# Patient Record
Sex: FEMALE | Race: OTHER | NOT HISPANIC OR LATINO | URBAN - METROPOLITAN AREA
[De-identification: names, ages, dates, MRNs, and addresses within clinical notes are randomized per-mention and may not be internally consistent; named-entity substitution may affect disease eponyms.]

---

## 2022-05-07 ENCOUNTER — EMERGENCY (EMERGENCY)
Facility: HOSPITAL | Age: 82
LOS: 1 days | Discharge: HOME | End: 2022-05-07
Attending: EMERGENCY MEDICINE | Admitting: STUDENT IN AN ORGANIZED HEALTH CARE EDUCATION/TRAINING PROGRAM
Payer: MEDICARE

## 2022-05-07 VITALS
RESPIRATION RATE: 18 BRPM | DIASTOLIC BLOOD PRESSURE: 58 MMHG | TEMPERATURE: 98 F | HEART RATE: 70 BPM | OXYGEN SATURATION: 100 % | SYSTOLIC BLOOD PRESSURE: 149 MMHG

## 2022-05-07 PROCEDURE — 73130 X-RAY EXAM OF HAND: CPT | Mod: 26,RT

## 2022-05-07 PROCEDURE — 29125 APPL SHORT ARM SPLINT STATIC: CPT

## 2022-05-07 PROCEDURE — 99285 EMERGENCY DEPT VISIT HI MDM: CPT | Mod: 25

## 2022-05-07 PROCEDURE — 73120 X-RAY EXAM OF HAND: CPT | Mod: 26,52,RT

## 2022-05-07 PROCEDURE — 70450 CT HEAD/BRAIN W/O DYE: CPT | Mod: 26,MA

## 2022-05-07 PROCEDURE — 73090 X-RAY EXAM OF FOREARM: CPT | Mod: 26,RT

## 2022-05-07 PROCEDURE — 73110 X-RAY EXAM OF WRIST: CPT | Mod: 26,RT

## 2022-05-07 PROCEDURE — 73100 X-RAY EXAM OF WRIST: CPT | Mod: 26,59

## 2022-05-07 NOTE — ED PROVIDER NOTE - NS_EDPROVIDERDISPOUSERTYPE_ED_A_ED
I will SWITCH the dose or number of times a day I take the medications listed below when I get home from the hospital:  None Attending Attestation (For Attendings USE Only)...

## 2022-05-07 NOTE — ED PROCEDURE NOTE - CPROC ED POST PROC CARE GUIDE1
follow up with DR Charles Kaufman /Verbal/written post procedure instructions were given to patient/caregiver./Instructed patient/caregiver regarding signs and symptoms of infection.

## 2022-05-07 NOTE — ED PROVIDER NOTE - ATTENDING APP SHARED VISIT CONTRIBUTION OF CARE
I performed a face to face evaluation of this patient and obtained a history and performed a full exam.  I agree with the history, physical exam and plan of the PA.  Pt s/p fall a few days ago, trip and fall with pain and swelling to wrist with ecchymoses; from of the elbow, distal neurovascular intact and neuro exam wnl. Heart and lungs wnl.  For xrays, head ct and reassess, sign out given to Dr. Dubose.

## 2022-05-07 NOTE — ED PROVIDER NOTE - PROGRESS NOTE DETAILS
Lucy Dubose MD, Attending: Patient received at attending sign out from Dr. Ratliff, distal radial fx, non displacement, ct head negative finding, post-mechanical fall, pt is AOx4, reliable family members, agreed with out pt follow up, splint and out pt ortho follow up.

## 2022-05-07 NOTE — ED PROVIDER NOTE - CHPI ED SYMPTOMS POS
TRANSITIONAL CARE MANAGEMENT - HOSPITAL DISCHARGE FOLLOW-UP    Attempted to contact patient for TCM follow-up after hospital discharge on 2/11/21, but review of notes indicate patient is currently hospitalized as SLSS.       EDEMA/JOINT SWELLING/PAIN/TENDERNESS

## 2022-05-07 NOTE — ED PROVIDER NOTE - NSFOLLOWUPINSTRUCTIONS_ED_ALL_ED_FT
PLEASE MAKE SURE YOU FOLLOW UP WITH DR VALENTE DIOP ORTHOPEDIST () AS SOON AS POSSIBLE.     Fracture    A fracture is a break in one of your bones. This can occur from a variety of injuries, especially traumatic ones. Symptoms include pain, bruising, or swelling. Do not use the injured limb. If a fracture is in one of the bones below your waist, do not put weight on that limb unless instructed to do so by your healthcare provider. Crutches or a cane may have been provided. A splint or cast may have been applied by your health care provider. Make sure to keep it dry and follow up with an orthopedist as instructed.    SEEK IMMEDIATE MEDICAL CARE IF YOU HAVE ANY OF THE FOLLOWING SYMPTOMS: numbness, tingling, increasing pain, or weakness in any part of the injured limb.

## 2022-05-07 NOTE — ED PROCEDURE NOTE - PROCEDURE DATE TIME, MLM
Patient presented to clinic for  and demonstration of the 2 Week actigraphy. Patient was set up and instructed use. Patient verbalized understanding and will be returning device by 01/11/2018 @ 8pm sleep study    Patient was given sleep logs and written instructions for use    LAURIE Nolasco  Sleep Clinic-Specialist,    Registered Medical Assistant   [facility]      
07-May-2022 16:27

## 2022-05-07 NOTE — ED PROVIDER NOTE - NS ED ATTENDING STATEMENT MOD
This was a shared visit with the FEDERICO. I reviewed and verified the documentation and independently performed the documented:

## 2022-05-07 NOTE — ED ADULT TRIAGE NOTE - CHIEF COMPLAINT QUOTE
Pt. states she slipped and fell in the driveway, landing on right arm. Went to urgent care and was told right wrist is broken and to come to ED for further evaluation. Arrives with right arm in sling. Has CD of imaging. Denies numbness. PMHx: HTN

## 2022-05-07 NOTE — ED PROVIDER NOTE - PATIENT PORTAL LINK FT
You can access the FollowMyHealth Patient Portal offered by [facility] by registering at the following website: [email or web address]. By joining [facility]’s FollowMyHealth portal, you will also be able to view your health information using other applications (apps) compatible with our system.

## 2022-05-07 NOTE — ED PROVIDER NOTE - PHYSICAL EXAMINATION
right forearm, bruising, discoloration, tenderness, + ulnar and radial pulses right forearm, bruising, discoloration, tenderness, + ulnar and radial pulses  + swelling to right parietal bone  + bruising ecchymosis to right forearm, wrist and thumb

## 2022-05-07 NOTE — ED PROVIDER NOTE - CLINICAL SUMMARY MEDICAL DECISION MAKING FREE TEXT BOX
This is a 82 yr old F, pmh htn, hysterectomy s/p fall, injury to right forearm on Wednesday, fall and slip on the , hit the concrete.  Bruising, swollen, tender, some limited rom to wrist. Reports pain 3/10, took tylenol pta.  xray wrist and forearm, thumb   ortho consult possible This is a 82 yr old F, pmh htn, hysterectomy s/p fall, injury to right forearm on Wednesday, fall and slip on the , hit the concrete.  Bruising, swollen, tender, some limited rom to wrist. Reports pain 3/10, took tylenol pta.  xray wrist and forearm, thumb   ct head r/o but not limited to subdural   ortho consult possible This is a 82 yr old F, pmh htn, hysterectomy s/p fall, injury to right forearm on Wednesday, fall and slip on the , hit the concrete.  Bruising, swollen, tender, some limited rom to wrist. Reports pain 3/10, took tylenol pta.  xray wrist and forearm, thumb - nondisplaced right radius fx   ct head r/o but not limited to subdural - wnl  ortho consult possible

## 2022-05-09 PROBLEM — I10 ESSENTIAL (PRIMARY) HYPERTENSION: Chronic | Status: ACTIVE | Noted: 2022-05-07

## 2022-05-10 ENCOUNTER — TRANSCRIPTION ENCOUNTER (OUTPATIENT)
Age: 82
End: 2022-05-10

## 2022-05-10 ENCOUNTER — APPOINTMENT (OUTPATIENT)
Dept: ORTHOPEDIC SURGERY | Facility: CLINIC | Age: 82
End: 2022-05-10
Payer: MEDICARE

## 2022-05-10 VITALS — HEIGHT: 64 IN | WEIGHT: 130 LBS | BODY MASS INDEX: 22.2 KG/M2

## 2022-05-10 PROCEDURE — 29075 APPL CST ELBW FNGR SHORT ARM: CPT

## 2022-05-10 NOTE — HISTORY OF PRESENT ILLNESS
[de-identified] : Patient is a RHD 82 year old female who presents with complaints of right wrist pain secondary to a fracture which occurred on 5/4/22. At that time, she fell while in her home. She landed on an outstretched but did not immediately feel pain. Her daughter who is present today states her mother did no immediately tell her of the injury as again she was not in much pain. She was seen in the ER at [facility] on Saturday 5/7/22 where xrays were obtained and revealed a nondisplaced distal radius fracture. She was splnited and referred to me for further treatment.

## 2022-05-10 NOTE — END OF VISIT
[FreeTextEntry3] : All medical record entries made by the Scribe were at my,  Dr. Anupam William MD., direction and personally dictated by me on 05/10/2022. I have personally reviewed the chart and agree that the record accurately reflects my personal performance of the history, physical exam, assessment and plan.

## 2022-05-10 NOTE — ADDENDUM
[FreeTextEntry1] : I, Rand Wade wrote this note acting as a scribe for Dr. Anupam William on May 10, 2022.

## 2022-05-10 NOTE — RETURN TO WORK/SCHOOL
[FreeTextEntry1] : Ms. TA MCKEON was seen in the office today on 05/10/2022 and evaluated by me for an Orthopedic visit. Please be advised that it is my recommendation she be provided with an at home health aide at this time. She is unable to complete daily activities of living given that she is right handed and sustained a fracture to the right wrist on 5/4/22 after a slip and fall in her home. \par \par Sincerely,\par \par Dr. Anupam William M.D. on 05/10/2022.

## 2022-05-10 NOTE — DISCUSSION/SUMMARY
[de-identified] : The underlying pathophysiology was reviewed with the patient. XR films were reviewed with the patient. Discussed at length the nature of the patient’s condition. The right wrist symptoms appear secondary to distal radius fracture.\par \par At this time, given the good alignment of the fracture, I have recommended cast immobilization for 6 weeks.\par She was placed into a right short arm cast on 5/10/22.\par Proper cast care instructions were reviewed with the patient.\par She as advised that she may use the hand for ADLs as tolerated while casted.\par She was instructed on ROM exercises of the shoulder, elbow, wrist and hand while casted.\par Motrin prn.\par \par All questions answered, understanding verbalized. Patient in agreement with plan of care. Follow up in 3 weeks for repeat xrays in the cast.

## 2022-05-10 NOTE — PHYSICAL EXAM
[de-identified] : Patient is WDWN, alert, and in no acute distress. Breathing is unlabored. She is grossly oriented to person, place, and time.\par \par She is accompanied by her daughter today.\par \par Right Wrist:\par She presents in a volar fiber glass wrist splint which was removed for physical exam and cast placement.\par She has mild edema noted to the dorsum of the hand and dorsally to the wrist.\par ROM to the wrist not accessed given injury.\par Full digital motion present into flexion and extension.\par Sensation to the digits is intact and normal. [de-identified] : EXAM: XR FOREARM 2 VIEWS RT\par EXAM: XR WRIST COMP MIN 3 VIEWS RT\par PROCEDURE DATE: 05/07/2022\par IMPRESSION:\par Acute impacted nondisplaced fracture of the distal radial head with associated soft tissue swelling.\par \par HERRERA DUENAS MD; Resident Radiologist\par This document has been electronically signed.\par RAUDEL ZULUAGA MD; Attending Radiologist\par This document has been electronically signed. May 7 2022 2:55PM

## 2022-05-11 ENCOUNTER — TRANSCRIPTION ENCOUNTER (OUTPATIENT)
Age: 82
End: 2022-05-11

## 2022-05-31 ENCOUNTER — APPOINTMENT (OUTPATIENT)
Dept: ORTHOPEDIC SURGERY | Facility: CLINIC | Age: 82
End: 2022-05-31
Payer: MEDICARE

## 2022-05-31 VITALS — HEIGHT: 63 IN | WEIGHT: 125 LBS | BODY MASS INDEX: 22.15 KG/M2

## 2022-05-31 PROCEDURE — 73110 X-RAY EXAM OF WRIST: CPT | Mod: RT

## 2022-05-31 PROCEDURE — 99213 OFFICE O/P EST LOW 20 MIN: CPT

## 2022-05-31 NOTE — END OF VISIT
Report given to EZRA Alexander.   [FreeTextEntry3] : All medical record entries made by the Scribe were at my,  Dr. Anupam William MD., direction and personally dictated by me on 05/31/2022. I have personally reviewed the chart and agree that the record accurately reflects my personal performance of the history, physical exam, assessment and plan.

## 2022-05-31 NOTE — DISCUSSION/SUMMARY
[de-identified] : The underlying pathophysiology was reviewed with the patient. XR films were reviewed with the patient. Discussed at length the nature of the patient’s condition. The right wrist symptoms appear secondary to distal radius fracture.\par \par She was advised that the fracture is healing well and has not displaced since the initial xrays and casting in office 3 weeks ago. She will continue in the cast for another 3 weeks. \par \par All questions answered, understanding verbalized. Patient in agreement with plan of care. Follow up in 3 weeks for repeat xrays and cast removal.

## 2022-05-31 NOTE — PHYSICAL EXAM
[de-identified] : Patient is WDWN, alert, and in no acute distress. Breathing is unlabored. She is grossly oriented to person, place, and time.\par \par She is accompanied by her daughter today.\par \par Right Wrist:\par She presents in a right short arm cast, which is fitting well.\par Digits are moving freely with brisk capillary refill\par Sensation is intact to the digits.  [de-identified] : AP, lateral and oblique views of the RIGHT wrist were obtained today and revealed an impacted nondisplaced fracture to the distal radius. The fracture is perfectly aligned. \par \par ------------------------------------------------------------------------------------------------------------------------------------------------------------------------------------\par \par EXAM: XR FOREARM 2 VIEWS RT\par EXAM: XR WRIST COMP MIN 3 VIEWS RT\par PROCEDURE DATE: 05/07/2022\par IMPRESSION:\par Acute impacted nondisplaced fracture of the distal radial head with associated soft tissue swelling.\par \par HERRERA DUENAS MD; Resident Radiologist\par This document has been electronically signed.\par RAUDEL ZULUAGA MD; Attending Radiologist\par This document has been electronically signed. May 7 2022 2:55PM

## 2022-05-31 NOTE — ADDENDUM
[FreeTextEntry1] : I, Rand Wade wrote this note acting as a scribe for Dr. Anupam William on May 31, 2022.

## 2022-05-31 NOTE — HISTORY OF PRESENT ILLNESS
[de-identified] : Patient is a RHD 82 year old female who presents with complaints of right wrist pain secondary to a fracture which occurred on 5/4/22. At that time, she fell while in her home. She landed on an outstretched but did not immediately feel pain. Her daughter who is present today states her mother did no immediately tell her of the injury as again she was not in much pain. She was seen in the ER at [facility] on Saturday 5/7/22 where xrays were obtained and revealed a nondisplaced distal radius fracture. She was initially treated on 5/10/22 in the office at which time I recommended cast immobilization given the good alignment of the fracture. She returns to the office for repeat xrays while in the cast on 5/31/22. She reports to be doing well while in the cast. SHe has minimal pain while casted and has improvements in motion most notably into supination while in the cast. She takes Tylenol intermittently for pain.

## 2022-06-21 ENCOUNTER — APPOINTMENT (OUTPATIENT)
Dept: ORTHOPEDIC SURGERY | Facility: CLINIC | Age: 82
End: 2022-06-21
Payer: MEDICARE

## 2022-06-21 PROCEDURE — 73110 X-RAY EXAM OF WRIST: CPT | Mod: RT

## 2022-06-21 PROCEDURE — 99213 OFFICE O/P EST LOW 20 MIN: CPT

## 2022-06-21 NOTE — PHYSICAL EXAM
[de-identified] : Patient is WDWN, alert, and in no acute distress. Breathing is unlabored. She is grossly oriented to person, place, and time.\par \par She is accompanied by her daughter today.\par \par Right Wrist:\par She presents in a right short arm cast, which was removed in the office today on 6/21/22.\par Once the cast was removed, there is no skin breakdown.\par Limitations of wrist motion status post casting for 6 weeks.\par Mild edema noted to the wrist dorsally. \par Full digital motion into flexion and extension\par Sensation is intact to the digits. \par \par  [de-identified] : AP, lateral and oblique views of the RIGHT wrist were obtained today and revealed an impacted nondisplaced fracture to the distal radius. The fracture is perfectly aligned. \par \par ------------------------------------------------------------------------------------------------------------------------------------------------------------------------------------\par \par EXAM: XR FOREARM 2 VIEWS RT\par EXAM: XR WRIST COMP MIN 3 VIEWS RT\par PROCEDURE DATE: 05/07/2022\par IMPRESSION:\par Acute impacted nondisplaced fracture of the distal radial head with associated soft tissue swelling.\par \par HERRERA DUENAS MD; Resident Radiologist\par This document has been electronically signed.\par RAUDEL ZULUAGA MD; Attending Radiologist\par This document has been electronically signed. May 7 2022 2:55PM

## 2022-06-21 NOTE — END OF VISIT
[FreeTextEntry3] : All medical record entries made by the Scribe were at my,  Dr. Anupam William MD., direction and personally dictated by me on 06/21/2022. I have personally reviewed the chart and agree that the record accurately reflects my personal performance of the history, physical exam, assessment and plan.

## 2022-06-21 NOTE — RETURN TO WORK/SCHOOL
[FreeTextEntry1] : Ms. TA MCKEON was seen in the office today on 06/21/2022 and evaluated by me for an Orthopedic visit secondary to a right wrist fracture sustained 6 weeks ago. Her daughter, Justin Swartz accompanied her to the office visit today. It is my recommendation that she be granted family leave until further notice as her mother fully recovers from the right wrist fracture. Her mother, Ms. TA MCKEON is an 82 year old female who lives on her own and therefore needs the assistance of her daughter to care for her as she recovers.\par \par Sincerely,\par \par Dr. Anupam William M.D. on 06/21/2022.

## 2022-06-21 NOTE — HISTORY OF PRESENT ILLNESS
[de-identified] : Patient is a RHD 82 year old female who presents with complaints of right wrist pain secondary to a fracture which occurred on 5/4/22. At that time, she fell while in her home. She landed on an outstretched but did not immediately feel pain. Her daughter who is present today states her mother did no immediately tell her of the injury as again she was not in much pain. She was seen in the ER at [facility] on Saturday 5/7/22 where xrays were obtained and revealed a nondisplaced distal radius fracture. She was initially treated on 5/10/22 in the office at which time I recommended cast immobilization given the good alignment of the fracture. She returned to the office for repeat xrays while in the cast on 5/31/22. She reported to be doing well while in the cast. She has minimal pain while casted and has improvements in motion most notably into supination while in the cast. She took Tylenol intermittently for pain. She returns on 6/21/22 for repeat xrays and cast removal. She reports to be

## 2022-06-21 NOTE — ADDENDUM
[FreeTextEntry1] : I, Rand Wade wrote this note acting as a scribe for Dr. Anupam William on Jun 21, 2022.

## 2022-06-21 NOTE — DISCUSSION/SUMMARY
[de-identified] : The underlying pathophysiology was reviewed with the patient. XR films were reviewed with the patient. Discussed at length the nature of the patient’s condition. The right wrist symptoms appear secondary to distal radius fracture.\par \par The right short arm cast was removed in the office today on 6/21/22.\par She may use a wrist brace for support as needed, most notably with any sort of heavy lifting or housework. I however advised her to wear the wrist only when necessary as I told that in the long term it will do more harm than good because it will restrict her motion.\par She was advised to use the hand as tolerated for ADLs and was instructed on flexion and extension exercises of the digits and wrist.\par \par All questions answered, understanding verbalized. Patient in agreement with plan of care. Follow up as needed.

## 2022-09-13 ENCOUNTER — APPOINTMENT (OUTPATIENT)
Dept: ORTHOPEDIC SURGERY | Facility: CLINIC | Age: 82
End: 2022-09-13

## 2022-09-13 DIAGNOSIS — S52.591D OTHER FRACTURES OF LOWER END OF RIGHT RADIUS, SUBSEQUENT ENCOUNTER FOR CLOSED FRACTURE WITH ROUTINE HEALING: ICD-10-CM

## 2022-09-13 PROCEDURE — 99213 OFFICE O/P EST LOW 20 MIN: CPT

## 2022-09-13 PROCEDURE — 73110 X-RAY EXAM OF WRIST: CPT | Mod: RT

## 2022-09-13 NOTE — HISTORY OF PRESENT ILLNESS
[de-identified] : Patient is a RHD 82 year old female who presents with complaints of right wrist pain secondary to a fracture which occurred on 5/4/22. At that time, she fell while in her home. She landed on an outstretched but did not immediately feel pain. Her daughter who is present today states her mother did no immediately tell her of the injury as again she was not in much pain. She was seen in the ER at [facility] on Saturday 5/7/22 where xrays were obtained and revealed a nondisplaced distal radius fracture. She was initially treated on 5/10/22 in the office at which time I recommended cast immobilization given the good alignment of the fracture. She returned to the office for repeat xrays while in the cast on 5/31/22. She reported to be doing well while in the cast. She has minimal pain while casted and has improvements in motion most notably into supination while in the cast. She took Tylenol intermittently for pain. She returns on 9/13/22 for repeat xrays. She reports to have residual pain to the wrist. She states the pain increases with increased use and activity.

## 2022-09-13 NOTE — DISCUSSION/SUMMARY
[de-identified] : The underlying pathophysiology was reviewed with the patient. XR films were reviewed with the patient. Discussed at length the nature of the patient’s condition. The right wrist symptoms appear secondary to distal radius fracture.\par \par At this time, I told her that maximum improvement will take up to 1 year and I would expect her to continue to improve. She was instructed to continue to use the hand normally for all ADLs. She may use a wrist brace as needed if her pain increases with any sort of heavy activity.\par \par All questions answered, understanding verbalized. Patient in agreement with plan of care. Follow up as needed.

## 2022-09-13 NOTE — PHYSICAL EXAM
[de-identified] : Patient is WDWN, alert, and in no acute distress. Breathing is unlabored. She is grossly oriented to person, place, and time.\par \par She is accompanied by her daughter today.\par \par Right Wrist:\par Full wrist motion into flexion and extension.\par Mild residual edema noted to the wrist dorsally. \par Full digital motion into flexion and extension\par Sensation is intact to the digits.  [de-identified] : AP, lateral and oblique views of the RIGHT wrist were obtained today and revealed an impacted nondisplaced fracture to the distal radius. The fracture is perfectly aligned. The fracture is fully healed.\par \par ------------------------------------------------------------------------------------------------------------------------------------------------------------------------------------\par \par EXAM: XR FOREARM 2 VIEWS RT\par EXAM: XR WRIST COMP MIN 3 VIEWS RT\par PROCEDURE DATE: 05/07/2022\par IMPRESSION:\par Acute impacted nondisplaced fracture of the distal radial head with associated soft tissue swelling.\par \par HERRERA DUENAS MD; Resident Radiologist\par This document has been electronically signed.\par RAUDEL ZULUAGA MD; Attending Radiologist\par This document has been electronically signed. May 7 2022 2:55PM

## 2022-09-13 NOTE — ADDENDUM
[FreeTextEntry1] : I, Rand Wade wrote this note acting as a scribe for Dr. Anupam William on Sep 13, 2022.

## 2022-09-13 NOTE — END OF VISIT
[FreeTextEntry3] : All medical record entries made by the Scribe were at my,  Dr. Anupam William MD., direction and personally dictated by me on 09/13/2022. I have personally reviewed the chart and agree that the record accurately reflects my personal performance of the history, physical exam, assessment and plan.

## 2023-05-17 ENCOUNTER — APPOINTMENT (OUTPATIENT)
Dept: INTERNAL MEDICINE | Facility: CLINIC | Age: 83
End: 2023-05-17
Payer: MEDICARE

## 2023-05-17 VITALS
WEIGHT: 120 LBS | HEIGHT: 63 IN | DIASTOLIC BLOOD PRESSURE: 67 MMHG | HEART RATE: 65 BPM | OXYGEN SATURATION: 99 % | RESPIRATION RATE: 16 BRPM | TEMPERATURE: 97.6 F | BODY MASS INDEX: 21.26 KG/M2 | SYSTOLIC BLOOD PRESSURE: 133 MMHG

## 2023-05-17 DIAGNOSIS — R60.0 LOCALIZED EDEMA: ICD-10-CM

## 2023-05-17 DIAGNOSIS — Z85.41 PERSONAL HISTORY OF MALIGNANT NEOPLASM OF CERVIX UTERI: ICD-10-CM

## 2023-05-17 DIAGNOSIS — S81.801A UNSPECIFIED OPEN WOUND, RIGHT LOWER LEG, INITIAL ENCOUNTER: ICD-10-CM

## 2023-05-17 PROCEDURE — 36415 COLL VENOUS BLD VENIPUNCTURE: CPT

## 2023-05-17 PROCEDURE — 99204 OFFICE O/P NEW MOD 45 MIN: CPT | Mod: 25

## 2023-05-17 NOTE — PLAN
[FreeTextEntry1] : -Start abx for cellulitis, wound care discussed with patient, check CBC\par -Leg elevation\par -Pending appt w/ vascular\par -Chronic blood pressure medication refilled, BMP today \par Completed labs in office today, will await results and notify patient accordingly\par

## 2023-05-17 NOTE — HEALTH RISK ASSESSMENT
[0] : 2) Feeling down, depressed, or hopeless: Not at all (0) [PHQ-2 Negative - No further assessment needed] : PHQ-2 Negative - No further assessment needed [Never] : Never [[id number]] : 0

## 2023-05-17 NOTE — HISTORY OF PRESENT ILLNESS
[FreeTextEntry1] : New patient, establish care [de-identified] : Ms. TA MCKEON is a 83 year old female hx of HTN, venous stasis, varicosities presenting to establish care\par -For past two weeks with RLE > LLE and erythema and tenderness of lower leg\par -LLE is at her baseline and does note improvement of both legs with elevation, unable to tolerate compression stockings\par Sustained a wound on lateral ankle, no active drainage about two weeks ago as well\par She has an appointment pending with vascular \par -BP at goal, denies dizziness or RAMOS\par \par

## 2023-05-17 NOTE — PHYSICAL EXAM
[Normal] : normal rate, regular rhythm, normal S1 and S2 and no murmur heard [de-identified] : Right lateral ankle with healing ulcer, no active drainage noted, +b/l edema R > L [de-identified] : RLE with warmth, tender, erythema

## 2023-05-18 ENCOUNTER — APPOINTMENT (OUTPATIENT)
Dept: CARDIOLOGY | Facility: CLINIC | Age: 83
End: 2023-05-18
Payer: MEDICARE

## 2023-05-18 PROCEDURE — 93971 EXTREMITY STUDY: CPT

## 2023-05-19 LAB
ANION GAP SERPL CALC-SCNC: 8 MMOL/L
BASOPHILS # BLD AUTO: 0.02 K/UL
BASOPHILS NFR BLD AUTO: 0.3 %
BUN SERPL-MCNC: 21 MG/DL
CALCIUM SERPL-MCNC: 9.7 MG/DL
CHLORIDE SERPL-SCNC: 104 MMOL/L
CO2 SERPL-SCNC: 27 MMOL/L
CREAT SERPL-MCNC: 0.92 MG/DL
EGFR: 62 ML/MIN/1.73M2
EOSINOPHIL # BLD AUTO: 0.04 K/UL
EOSINOPHIL NFR BLD AUTO: 0.7 %
FERRITIN SERPL-MCNC: 89 NG/ML
GLUCOSE SERPL-MCNC: 102 MG/DL
HCT VFR BLD CALC: 32 %
HGB BLD-MCNC: 10 G/DL
IMM GRANULOCYTES NFR BLD AUTO: 0.2 %
IRON SATN MFR SERPL: 14 %
IRON SERPL-MCNC: 44 UG/DL
LYMPHOCYTES # BLD AUTO: 1.67 K/UL
LYMPHOCYTES NFR BLD AUTO: 27.7 %
MAN DIFF?: NORMAL
MCHC RBC-ENTMCNC: 29.4 PG
MCHC RBC-ENTMCNC: 31.3 GM/DL
MCV RBC AUTO: 94.1 FL
MONOCYTES # BLD AUTO: 0.46 K/UL
MONOCYTES NFR BLD AUTO: 7.6 %
NEUTROPHILS # BLD AUTO: 3.82 K/UL
NEUTROPHILS NFR BLD AUTO: 63.5 %
PLATELET # BLD AUTO: 207 K/UL
POTASSIUM SERPL-SCNC: 4.3 MMOL/L
RBC # BLD: 3.4 M/UL
RBC # FLD: 13.6 %
SODIUM SERPL-SCNC: 139 MMOL/L
TIBC SERPL-MCNC: 310 UG/DL
UIBC SERPL-MCNC: 266 UG/DL
VIT B12 SERPL-MCNC: 333 PG/ML
WBC # FLD AUTO: 6.02 K/UL

## 2023-06-05 ENCOUNTER — APPOINTMENT (OUTPATIENT)
Dept: INTERNAL MEDICINE | Facility: CLINIC | Age: 83
End: 2023-06-05
Payer: MEDICARE

## 2023-06-05 ENCOUNTER — APPOINTMENT (OUTPATIENT)
Dept: VASCULAR SURGERY | Facility: CLINIC | Age: 83
End: 2023-06-05
Payer: MEDICARE

## 2023-06-05 VITALS
SYSTOLIC BLOOD PRESSURE: 148 MMHG | HEART RATE: 60 BPM | OXYGEN SATURATION: 97 % | HEIGHT: 63 IN | TEMPERATURE: 98.7 F | DIASTOLIC BLOOD PRESSURE: 65 MMHG | WEIGHT: 124 LBS | BODY MASS INDEX: 21.97 KG/M2

## 2023-06-05 VITALS
SYSTOLIC BLOOD PRESSURE: 150 MMHG | DIASTOLIC BLOOD PRESSURE: 87 MMHG | WEIGHT: 124 LBS | BODY MASS INDEX: 21.97 KG/M2 | OXYGEN SATURATION: 97 % | TEMPERATURE: 98 F | HEART RATE: 66 BPM | HEIGHT: 63 IN

## 2023-06-05 VITALS — SYSTOLIC BLOOD PRESSURE: 130 MMHG | DIASTOLIC BLOOD PRESSURE: 70 MMHG

## 2023-06-05 DIAGNOSIS — L03.115 CELLULITIS OF RIGHT LOWER LIMB: ICD-10-CM

## 2023-06-05 DIAGNOSIS — R60.9 EDEMA, UNSPECIFIED: ICD-10-CM

## 2023-06-05 PROCEDURE — 36415 COLL VENOUS BLD VENIPUNCTURE: CPT

## 2023-06-05 PROCEDURE — 99214 OFFICE O/P EST MOD 30 MIN: CPT | Mod: 25

## 2023-06-05 PROCEDURE — 99203 OFFICE O/P NEW LOW 30 MIN: CPT | Mod: 25

## 2023-06-05 PROCEDURE — 29580 STRAPPING UNNA BOOT: CPT | Mod: LT

## 2023-06-05 NOTE — PHYSICAL EXAM
[Normal] : normal rate, regular rhythm, normal S1 and S2 and no murmur heard [de-identified] : RLE warm to touch, faint erythema

## 2023-06-05 NOTE — HISTORY OF PRESENT ILLNESS
[FreeTextEntry1] : F/U on chronic conditions [de-identified] : -Chronic edema/venous insufficiency-Did not start keflex that was prescribed at initial visit, US negative for thrombosis\par Has appt pending w/ vascular today\par -Chronic anemia: Diagnosed at age 40, was advised to take ferrous sulfate\par Normal colonoscopy in the past\par Brought in previous labs, Hgb ranges from 10-11\par Denies melena, dizziness, or abdominal pain\par -HTN: BP at goal; denies RAMOS or CP\par -Hx of PreDM: Last a1c at 5.7

## 2023-06-05 NOTE — PLAN
[FreeTextEntry1] : -Chronic anemia, unclear etiology, last iron studies normal\par -Check a1c per patient request\par -Did not start abx, chronic skin changes, however, with  areas of erythema, warmth advised to start abx\par -Has new appt w/ vascular today\par Completed labs in office today, will await results and notify patient accordingly\par

## 2023-06-06 NOTE — PHYSICAL EXAM
[2+] : left 2+ [Calm] : calm [de-identified] : Well-appearing  [de-identified] : EOMI, anicteric  [de-identified] : motor and sensation intact in all four extremities  [de-identified] : linear ulcerations on medial right ankle, left foot with 1x0.5 ulceration on medial dorsum of food.  [de-identified] : A&Ox4

## 2023-06-06 NOTE — PROCEDURE
[FreeTextEntry1] : Indication:  Left  lower extremity venous stasis ulcer.\par \par Procedure: left lower extremity unna boot application. \par \par Procedure Note:  Ms. TA MCKEON is a 83 year old F with left lower extremity venous stasis ulcer.\par \par I have discussed the risks of the procedure with the patient. Detailed discussion was held with the patient. Patient tolerated the procedure well. Instructions reviewed with the patient and patient verbalized understanding.\par

## 2023-06-06 NOTE — CONSULT LETTER
[Dear  ___] : Dear  [unfilled], [Consult Letter:] : I had the pleasure of evaluating your patient, [unfilled]. [Please see my note below.] : Please see my note below. [Consult Closing:] : Thank you very much for allowing me to participate in the care of this patient.  If you have any questions, please do not hesitate to contact me. [Sincerely,] : Sincerely, [FreeTextEntry3] : \par \par \par \par Khadijah Rodriguez MD, FACS, RPVI\par Division of Vascular & Endovascular Surgery\par [facility], Department of Surgery  [FreeTextEntry1] : She presented with bilateral venous stasis ulcers. The right leg also has cellulitis. On exam, she has palpable lower extremity pulses. We jasmina obtain venous reflux studies for evaluation. In the meantime, I placed an unna boot on the left leg and plan to treat the right leg with similar dressings once the cellulitis is resolved. I plan to see her again in one week for unna boot change.

## 2023-06-06 NOTE — ASSESSMENT
[FreeTextEntry1] : TA MCKEON is a 83 year old female presents for evaluation.\par \par > Venous insufficiency, CEAP C6\par - Will obtain venous reflux studies for further evaluation. Prescription and number to schedule given to patient. \par - Right foot was wrapped in ace bandage for compression. patient to continue right leg compression with compression stockings, leg elevation, and ambulation.\par - Right leg cellulitis - agree w/ continuation of abx prescribed by Dr. Coker.\par - Left foot with unna boot placement today. \par - Follow up next week for unna boot placement.

## 2023-06-06 NOTE — HISTORY OF PRESENT ILLNESS
[FreeTextEntry1] : TA MCKEON is a 83 year old female who presents for evaluation of left leg ulcer. \par \par Referred by Dr. Kayy Coker. \par \par Patient presents with daughter, Justin, for evaluation of right leg ulcer. The ulcer began one month ago. The patient reports a history of venous insufficiency and had laser ablations on the bilateral legs 3 years ago with Dr. Lezama, who is now retired. No fever or chills. No drainage from the ulcer. Patient wears compression stockings almost daily. She was evaluated by Dr. Sultana and was prescribed antibiotics for her right leg ulcer. \par No prior history of ulcers in the past. No personal or family history of DVT. \par \par + PMH: HTN, anemia (undergoing workup)\par + PSH: hysterectomy\par + FH: denies\par + SH: never smoker, no etoh use, no illicit user\par + Aller: NKDA\par \par PCP is Dr. Sheela Sultana.

## 2023-06-08 ENCOUNTER — NON-APPOINTMENT (OUTPATIENT)
Age: 83
End: 2023-06-08

## 2023-06-08 DIAGNOSIS — D50.9 IRON DEFICIENCY ANEMIA, UNSPECIFIED: ICD-10-CM

## 2023-06-08 LAB
ESTIMATED AVERAGE GLUCOSE: 120 MG/DL
HBA1C MFR BLD HPLC: 5.8 %
HCT VFR BLD CALC: 31.3 %
HGB A MFR BLD: 97.5 %
HGB A2 MFR BLD: 2.5 %
HGB BLD-MCNC: 9.9 G/DL
HGB FRACT BLD-IMP: NORMAL
MCHC RBC-ENTMCNC: 29.6 PG
MCHC RBC-ENTMCNC: 31.6 GM/DL
MCV RBC AUTO: 93.4 FL
PLATELET # BLD AUTO: 190 K/UL
RBC # BLD: 3.35 M/UL
RBC # FLD: 13.4 %
WBC # FLD AUTO: 5.61 K/UL

## 2023-06-12 ENCOUNTER — APPOINTMENT (OUTPATIENT)
Dept: VASCULAR SURGERY | Facility: CLINIC | Age: 83
End: 2023-06-12
Payer: MEDICARE

## 2023-06-12 VITALS
HEART RATE: 60 BPM | BODY MASS INDEX: 24.03 KG/M2 | WEIGHT: 124 LBS | SYSTOLIC BLOOD PRESSURE: 139 MMHG | DIASTOLIC BLOOD PRESSURE: 61 MMHG | HEIGHT: 60.3 IN | TEMPERATURE: 97.5 F | OXYGEN SATURATION: 98 %

## 2023-06-12 PROCEDURE — 29580 STRAPPING UNNA BOOT: CPT | Mod: 50

## 2023-06-12 NOTE — PROCEDURE
[FreeTextEntry1] : Indication:  Bilateral  lower extremity venous stasis ulcer.\par \par Procedure: Bilateral lower extremity unna boot application. \par \par Procedure Note:  Ms. TA MCKEON is a 83 year old F with bilateral lower extremity venous stasis ulcer.\par \par I have discussed the risks of the procedure with the patient. Detailed discussion was held with the patient. Patient tolerated the procedure well. Instructions reviewed with the patient and patient verbalized understanding.\par

## 2023-06-12 NOTE — ASSESSMENT
[FreeTextEntry1] : TA MCKEON is a 83 year old female presents for evaluation.\par \par > Venous insufficiency, CEAP C6\par - Will obtain venous reflux studies for further evaluation. Prescription and number to schedule given to patient. \par - Bilateral unna boot placement.\par - follow up in one week for unna boot exchange.

## 2023-06-12 NOTE — PHYSICAL EXAM
[2+] : left 2+ [Calm] : calm [de-identified] : Well-appearing  [de-identified] : EOMI, anicteric  [de-identified] : motor and sensation intact in all four extremities  [de-identified] : linear ulcerations on medial right ankle, left foot with 1x0.5 ulceration on medial dorsum of food.  [de-identified] : A&Ox4

## 2023-06-12 NOTE — HISTORY OF PRESENT ILLNESS
[FreeTextEntry1] : TA MCKEON is a 83 year old female who presents for evaluation of left leg ulcer. \par \par Referred by Dr. Kayy Coker. \par \par Patient presents with daughter, Justin, for evaluation of right leg ulcer. The ulcer began one month ago. The patient reports a history of venous insufficiency and had laser ablations on the bilateral legs 3 years ago with Dr. Lezama, who is now retired. No fever or chills. No drainage from the ulcer. Patient wears compression stockings almost daily. She was evaluated by Dr. Sultana and was prescribed antibiotics for her right leg ulcer. \par No prior history of ulcers in the past. No personal or family history of DVT. \par \par + PMH: HTN, anemia (undergoing workup)\par + PSH: hysterectomy\par + FH: denies\par + SH: never smoker, no etoh use, no illicit user\par + Aller: NKDA\par \par PCP is Dr. Sheela Sultana.  [de-identified] : 6/12/23 - Pt presents for follow up w/ daughter. right leg cellulitis much improved. no fever or chills. \par left foot wound continues to hurt. has not yet undergone venous reflux studies.

## 2023-06-19 ENCOUNTER — APPOINTMENT (OUTPATIENT)
Dept: VASCULAR SURGERY | Facility: CLINIC | Age: 83
End: 2023-06-19
Payer: MEDICARE

## 2023-06-19 VITALS
BODY MASS INDEX: 24.35 KG/M2 | HEART RATE: 59 BPM | WEIGHT: 124 LBS | DIASTOLIC BLOOD PRESSURE: 69 MMHG | TEMPERATURE: 98 F | OXYGEN SATURATION: 97 % | HEIGHT: 60 IN | SYSTOLIC BLOOD PRESSURE: 144 MMHG

## 2023-06-19 PROCEDURE — 29580 STRAPPING UNNA BOOT: CPT | Mod: 50

## 2023-06-19 NOTE — HISTORY OF PRESENT ILLNESS
[FreeTextEntry1] : TA MCKEON is a 83 year old female who presents for evaluation of left leg ulcer. \par \par Referred by Dr. Kayy Coker. \par \par Patient presents with daughter, Justin, for evaluation of right leg ulcer. The ulcer began one month ago. The patient reports a history of venous insufficiency and had laser ablations on the bilateral legs 3 years ago with Dr. Lezama, who is now retired. No fever or chills. No drainage from the ulcer. Patient wears compression stockings almost daily. She was evaluated by Dr. Sultana and was prescribed antibiotics for her right leg ulcer. \par No prior history of ulcers in the past. No personal or family history of DVT. \par \par + PMH: HTN, anemia (undergoing workup)\par + PSH: hysterectomy\par + FH: denies\par + SH: never smoker, no etoh use, no illicit user\par + Aller: NKDA\par \par PCP is Dr. Sheela Sultana.  [de-identified] : 6/12/23 - Pt presents for follow up w/ daughter. right leg cellulitis much improved. no fever or chills. \par left foot wound continues to hurt. has not yet undergone venous reflux studies. \par \par 6/19/2023 - Pt presetns for follow up. Right leg wound is healing nicely. Left leg as well but has continued pain at left leg. \par Scheduled for reflux test next week.

## 2023-06-19 NOTE — PHYSICAL EXAM
[2+] : left 2+ [Calm] : calm [de-identified] : Well-appearing  [de-identified] : EOMI, anicteric  [de-identified] : motor and sensation intact in all four extremities  [de-identified] : linear ulcerations healing w/ scabson medial right ankle, left foot with < 1 cm ulceration on medial dorsum of food.  [de-identified] : A&Ox4

## 2023-06-26 ENCOUNTER — APPOINTMENT (OUTPATIENT)
Dept: VASCULAR SURGERY | Facility: CLINIC | Age: 83
End: 2023-06-26

## 2023-06-26 ENCOUNTER — APPOINTMENT (OUTPATIENT)
Dept: VASCULAR SURGERY | Facility: CLINIC | Age: 83
End: 2023-06-26
Payer: MEDICARE

## 2023-06-26 VITALS — SYSTOLIC BLOOD PRESSURE: 146 MMHG | DIASTOLIC BLOOD PRESSURE: 71 MMHG | HEART RATE: 57 BPM

## 2023-06-26 VITALS
DIASTOLIC BLOOD PRESSURE: 68 MMHG | HEIGHT: 60 IN | TEMPERATURE: 97.5 F | WEIGHT: 124 LBS | SYSTOLIC BLOOD PRESSURE: 158 MMHG | HEART RATE: 63 BPM | BODY MASS INDEX: 24.35 KG/M2

## 2023-06-26 PROCEDURE — 29580 STRAPPING UNNA BOOT: CPT | Mod: 50

## 2023-06-26 PROCEDURE — 93970 EXTREMITY STUDY: CPT

## 2023-06-26 PROCEDURE — 99214 OFFICE O/P EST MOD 30 MIN: CPT | Mod: 25

## 2023-06-26 NOTE — PHYSICAL EXAM
[2+] : left 2+ [Calm] : calm [de-identified] : Well-appearing  [de-identified] : EOMI, anicteric  [de-identified] : motor and sensation intact in all four extremities  [de-identified] : linear ulcerations healing w/ scabson medial right ankle, left foot with < 1 cm ulceration on medial dorsum of food.  [de-identified] : A&Ox4

## 2023-06-26 NOTE — ASSESSMENT
[FreeTextEntry1] : TA MCKEON is a 83 year old female presents for evaluation.\par \par > Venous insufficiency, CEAP C6\par - Bilateral unna boot placement.\par - Pt is a candidate for left AGSV RFA\par - follow up in one week for unna boot exchange and further discuss possible left agsv rfa intervention

## 2023-06-26 NOTE — HISTORY OF PRESENT ILLNESS
[FreeTextEntry1] : TA MCKEON is a 83 year old female who presents for evaluation of left leg ulcer. \par \par Referred by Dr. Kayy Coker. \par \par Patient presents with daughter, Justin, for evaluation of right leg ulcer. The ulcer began one month ago. The patient reports a history of venous insufficiency and had laser ablations on the bilateral legs 3 years ago with Dr. Lezama, who is now retired. No fever or chills. No drainage from the ulcer. Patient wears compression stockings almost daily. She was evaluated by Dr. Sultana and was prescribed antibiotics for her right leg ulcer. \par No prior history of ulcers in the past. No personal or family history of DVT. \par \par + PMH: HTN, anemia (undergoing workup)\par + PSH: hysterectomy\par + FH: denies\par + SH: never smoker, no etoh use, no illicit user\par + Aller: NKDA\par \par PCP is Dr. Sheela Sultana.  [de-identified] : 6/12/23 - Pt presents for follow up w/ daughter. right leg cellulitis much improved. no fever or chills. \par left foot wound continues to hurt. has not yet undergone venous reflux studies. \par \par 6/19/2023 - Pt presetns for follow up. Right leg wound is healing nicely. Left leg as well but has continued pain at left leg. \par Scheduled for reflux test next week. \par \par 6/26/2023 - Pt presents for VI study and bilateral unna boot change. Accompanied by her daughter. No new complaints. Pt continues to experience pain by the wound on her left leg with standing and walking. Bilateral leg wounds are improving. No drainage.

## 2023-06-26 NOTE — DATA REVIEWED
[FreeTextEntry1] : 6/26/2023 bilateral lower extremities venous doppler\par no acute dvt or svt bilaterally\par right leg: no reflux\par left leg: reflux in AGSV\par lymph nodes in bilateral groins

## 2023-07-03 ENCOUNTER — APPOINTMENT (OUTPATIENT)
Dept: VASCULAR SURGERY | Facility: CLINIC | Age: 83
End: 2023-07-03
Payer: MEDICARE

## 2023-07-03 VITALS
OXYGEN SATURATION: 97 % | TEMPERATURE: 97.2 F | DIASTOLIC BLOOD PRESSURE: 64 MMHG | HEART RATE: 56 BPM | BODY MASS INDEX: 24.35 KG/M2 | HEIGHT: 60 IN | SYSTOLIC BLOOD PRESSURE: 138 MMHG | WEIGHT: 124 LBS

## 2023-07-03 PROCEDURE — 29580 STRAPPING UNNA BOOT: CPT | Mod: LT

## 2023-07-03 NOTE — DATA REVIEWED
[FreeTextEntry1] : 6/26/2023 bilateral lower extremity venous duplex\par Negative for DVT or SVT bilaterally.\par Left–AGSV with reflux measuring 6.3 at the junction and 6.5 mm proximally, greater than 1 second reflux.\par

## 2023-07-03 NOTE — HISTORY OF PRESENT ILLNESS
[FreeTextEntry1] : TA MCKEON is a 83 year old female who presents for evaluation of left leg ulcer. \par \par Referred by Dr. Kayy Coker. \par \par Patient presents with daughter, Justin, for evaluation of right leg ulcer. The ulcer began one month ago. The patient reports a history of venous insufficiency and had laser ablations on the bilateral legs 3 years ago with Dr. Lezama, who is now retired. No fever or chills. No drainage from the ulcer. Patient wears compression stockings almost daily. She was evaluated by Dr. Sultana and was prescribed antibiotics for her right leg ulcer. \par No prior history of ulcers in the past. No personal or family history of DVT. \par \par + PMH: HTN, anemia (undergoing workup)\par + PSH: hysterectomy\par + FH: denies\par + SH: never smoker, no etoh use, no illicit user\par + Aller: NKDA\par \par PCP is Dr. Sheela Sultana. \par \par 6/12/23 - Pt presents for follow up w/ daughter. right leg cellulitis much improved. no fever or chills. \par left foot wound continues to hurt. has not yet undergone venous reflux studies. \par \par 6/19/2023 - Pt presetns for follow up. Right leg wound is healing nicely. Left leg as well but has continued pain at left leg. \par Scheduled for reflux test next week. \par \par 6/26/2023 - Unna boot change.  [de-identified] : 7/3/2023 - Pt presents for follow up.

## 2023-07-03 NOTE — ASSESSMENT
[FreeTextEntry1] : TA MCKEON is a 83 year old female presents for evaluation.\par \par > Venous insufficiency, CEAP C6\par - Reviewed results of venous duplex w/ patient and daughter.  There is no evidence of DVT or SVT bilaterally.  There is reflux of the left a GSV.\par – Reviewed management options including left AGSV RFA in setting of left leg venous ulcer.  Risks, benefits, alternatives discussed with the patient and daughter..  All questions answered.  They elect to proceed.  We will schedule patient for left AGSV RFA.\par –Left Unna boot replaced.\par - follow up in one week for unna boot exchange.

## 2023-07-03 NOTE — PHYSICAL EXAM
[Calm] : calm [2+] : left 2+ [de-identified] : Well-appearing  [de-identified] : EOMI, anicteric  [de-identified] : motor and sensation intact in all four extremities  [de-identified] : linear ulcerations healing w/ scabson medial right ankle, left foot with < 1 cm ulceration on medial dorsum of food.  [de-identified] : A&Ox4

## 2023-07-10 ENCOUNTER — APPOINTMENT (OUTPATIENT)
Dept: VASCULAR SURGERY | Facility: CLINIC | Age: 83
End: 2023-07-10
Payer: MEDICARE

## 2023-07-10 VITALS
BODY MASS INDEX: 24.35 KG/M2 | HEIGHT: 60 IN | OXYGEN SATURATION: 98 % | TEMPERATURE: 97.2 F | HEART RATE: 70 BPM | SYSTOLIC BLOOD PRESSURE: 145 MMHG | DIASTOLIC BLOOD PRESSURE: 61 MMHG | WEIGHT: 124 LBS

## 2023-07-10 PROCEDURE — 29580 STRAPPING UNNA BOOT: CPT | Mod: LT

## 2023-07-10 NOTE — HISTORY OF PRESENT ILLNESS
[FreeTextEntry1] : TA MCKEON is a 83 year old female who presents for evaluation of left leg ulcer. \par \par Referred by Dr. Kayy Coker. \par \par Patient presents with daughter, Justin, for evaluation of right leg ulcer. The ulcer began one month ago. The patient reports a history of venous insufficiency and had laser ablations on the bilateral legs 3 years ago with Dr. Lezama, who is now retired. No fever or chills. No drainage from the ulcer. Patient wears compression stockings almost daily. She was evaluated by Dr. Sultana and was prescribed antibiotics for her right leg ulcer. \par No prior history of ulcers in the past. No personal or family history of DVT. \par \par + PMH: HTN, anemia (undergoing workup)\par + PSH: hysterectomy\par + FH: denies\par + SH: never smoker, no etoh use, no illicit user\par + Aller: NKDA\par \par PCP is Dr. Sheela Sultana. \par \par 6/12/23 - Pt presents for follow up w/ daughter. right leg cellulitis much improved. no fever or chills. \par left foot wound continues to hurt. has not yet undergone venous reflux studies. \par \par 6/19/2023 - Pt presetns for follow up. Right leg wound is healing nicely. Left leg as well but has continued pain at left leg. \par Scheduled for reflux test next week. \par \par 6/26/2023 - Unna boot change. \par \par 7/3/2023 - Pt presents for follow up.  [de-identified] : 7/10/2023 - Pt presents for follow up with daughter. left foot wound getting smaller with decreased pain. has been scheduled for vein procedure. no fever or chills.

## 2023-07-10 NOTE — ASSESSMENT
[FreeTextEntry1] : TA MCKEON is a 83 year old female presents for evaluation.\par \par > Venous insufficiency, CEAP C6\par - Reviewed results of venous duplex w/ patient and daughter.  There is no evidence of DVT or SVT bilaterally.  There is reflux of the left a GSV.\par – Pt scheduled for left AGSV ablation.\par –Left Unna boot replaced.\par - follow up in one week for unna boot exchange.

## 2023-07-10 NOTE — PHYSICAL EXAM
[2+] : left 2+ [Calm] : calm [de-identified] : Well-appearing  [de-identified] : EOMI, anicteric  [de-identified] : motor and sensation intact in all four extremities  [de-identified] : linear ulcerations healing w/ scabson medial right ankle, left foot with < 1 cm ulceration on medial dorsum of food.  [de-identified] : A&Ox4

## 2023-07-17 ENCOUNTER — APPOINTMENT (OUTPATIENT)
Dept: VASCULAR SURGERY | Facility: CLINIC | Age: 83
End: 2023-07-17
Payer: MEDICARE

## 2023-07-17 VITALS
DIASTOLIC BLOOD PRESSURE: 63 MMHG | OXYGEN SATURATION: 97 % | HEIGHT: 60 IN | HEART RATE: 58 BPM | WEIGHT: 124 LBS | BODY MASS INDEX: 24.35 KG/M2 | SYSTOLIC BLOOD PRESSURE: 144 MMHG | TEMPERATURE: 97.7 F

## 2023-07-17 PROCEDURE — 29580 STRAPPING UNNA BOOT: CPT | Mod: LT

## 2023-07-17 NOTE — HISTORY OF PRESENT ILLNESS
[FreeTextEntry1] : TA MCKEON is a 83 year old female who presents for evaluation of left leg ulcer. \par \par Referred by Dr. Kayy Coker. \par \par Patient presents with daughter, Justin, for evaluation of right leg ulcer. The ulcer began one month ago. The patient reports a history of venous insufficiency and had laser ablations on the bilateral legs 3 years ago with Dr. Lezama, who is now retired. No fever or chills. No drainage from the ulcer. Patient wears compression stockings almost daily. She was evaluated by Dr. Sultana and was prescribed antibiotics for her right leg ulcer. \par No prior history of ulcers in the past. No personal or family history of DVT. \par \par + PMH: HTN, anemia (undergoing workup)\par + PSH: hysterectomy\par + FH: denies\par + SH: never smoker, no etoh use, no illicit user\par + Aller: NKDA\par \par PCP is Dr. Sheela Sultana. \par \par 6/12/23 - Pt presents for follow up w/ daughter. right leg cellulitis much improved. no fever or chills. \par left foot wound continues to hurt. has not yet undergone venous reflux studies. \par \par 6/19/2023 - Pt presetns for follow up. Right leg wound is healing nicely. Left leg as well but has continued pain at left leg. \par Scheduled for reflux test next week. \par \par 6/26/2023 - Unna boot change. \par \par 7/3/2023 - Pt presents for follow up. \par \par 7/10/2023 - Pt presents for follow up with daughter. left foot wound getting smaller with decreased pain. has been scheduled for vein procedure. no fever or chills.  [de-identified] : 7/17/2023 - Presents for unna boot change. denies pain in foot.

## 2023-07-17 NOTE — PHYSICAL EXAM
[2+] : left 2+ [Calm] : calm [de-identified] : Well-appearing  [de-identified] : EOMI, anicteric  [de-identified] : motor and sensation intact in all four extremities  [de-identified] : left foot dorsum of foot ulceration healing w/ eschar overlying old ulcer. [de-identified] : A&Ox4

## 2023-07-24 ENCOUNTER — APPOINTMENT (OUTPATIENT)
Dept: VASCULAR SURGERY | Facility: CLINIC | Age: 83
End: 2023-07-24
Payer: MEDICARE

## 2023-07-24 VITALS
OXYGEN SATURATION: 98 % | BODY MASS INDEX: 24.35 KG/M2 | HEART RATE: 56 BPM | SYSTOLIC BLOOD PRESSURE: 141 MMHG | HEIGHT: 60 IN | TEMPERATURE: 98.3 F | DIASTOLIC BLOOD PRESSURE: 68 MMHG | WEIGHT: 124 LBS

## 2023-07-24 PROCEDURE — 29580 STRAPPING UNNA BOOT: CPT | Mod: LT

## 2023-07-24 NOTE — PHYSICAL EXAM
[2+] : left 2+ [Calm] : calm [de-identified] : Well-appearing  [de-identified] : EOMI, anicteric  [de-identified] : motor and sensation intact in all four extremities  [de-identified] : left foot dorsum of foot ulceration healing w/ small eschar overlying ulcer, smaller than before.  [de-identified] : A&Ox4

## 2023-07-24 NOTE — ASSESSMENT
[FreeTextEntry1] : TA MCKEON is a 83 year old female presents for evaluation.\par \par > Venous insufficiency, CEAP C6\par - Reviewed results of venous duplex w/ patient and daughter.  There is no evidence of DVT or SVT bilaterally.  There is reflux of the left a GSV.\par – Pt scheduled for left AGSV ablation.\par –Left Unna boot replaced. Patient instructed to remove in one week and start wearing compression stockings.

## 2023-07-24 NOTE — DATA REVIEWED
[FreeTextEntry1] : 6/26/2023 bilateral lower extremity venous duplex\par Negative for DVT or SVT bilaterally.\par Left–AGSV with reflux measuring 6.3 at the junction and 6.5 mm proximally, greater than 1 second reflux.\par  Arava Pregnancy And Lactation Text: This medication is Pregnancy Category X and is absolutely contraindicated during pregnancy. It is unknown if it is excreted in breast milk.

## 2023-07-24 NOTE — HISTORY OF PRESENT ILLNESS
[FreeTextEntry1] : TA MCKEON is a 83 year old female who presents for evaluation of left leg ulcer. \par \par Referred by Dr. Kayy Coker. \par \par Patient presents with daughter, Justin, for evaluation of right leg ulcer. The ulcer began one month ago. The patient reports a history of venous insufficiency and had laser ablations on the bilateral legs 3 years ago with Dr. Lezama, who is now retired. No fever or chills. No drainage from the ulcer. Patient wears compression stockings almost daily. She was evaluated by Dr. Sultana and was prescribed antibiotics for her right leg ulcer. \par No prior history of ulcers in the past. No personal or family history of DVT. \par \par + PMH: HTN, anemia (undergoing workup)\par + PSH: hysterectomy\par + FH: denies\par + SH: never smoker, no etoh use, no illicit user\par + Aller: NKDA\par \par PCP is Dr. Sheela Sultana. \par \par 6/12/23 - Pt presents for follow up w/ daughter. right leg cellulitis much improved. no fever or chills. \par left foot wound continues to hurt. has not yet undergone venous reflux studies. \par \par 6/19/2023 - Pt presetns for follow up. Right leg wound is healing nicely. Left leg as well but has continued pain at left leg. \par Scheduled for reflux test next week. \par \par 6/26/2023 - Unna boot change. \par \par 7/3/2023 - Pt presents for follow up. \par \par 7/10/2023 - Pt presents for follow up with daughter. left foot wound getting smaller with decreased pain. has been scheduled for vein procedure. no fever or chills. \par \par 7/17/2023 - Presents for unna boot change. denies pain in foot.  [de-identified] : 7/24/2023 - Presents for unna boot change. intermittent pain at site of wound but wound has closed. only small scab remains.

## 2023-08-02 RX ORDER — LIDOCAINE HYDROCHLORIDE 10 MG/ML
1 INJECTION, SOLUTION INFILTRATION; PERINEURAL
Qty: 50 | Refills: 0 | Status: ACTIVE | COMMUNITY
Start: 2023-08-02 | End: 1900-01-01

## 2023-08-02 RX ORDER — ALPRAZOLAM 0.5 MG/1
0.5 TABLET ORAL
Qty: 1 | Refills: 0 | Status: ACTIVE | COMMUNITY
Start: 2023-08-02 | End: 1900-01-01

## 2023-08-02 RX ORDER — CEPHALEXIN 500 MG/1
500 CAPSULE ORAL 3 TIMES DAILY
Qty: 21 | Refills: 0 | Status: DISCONTINUED | COMMUNITY
Start: 2023-05-17 | End: 2023-08-02

## 2023-08-02 RX ORDER — SODIUM BICARBONATE 84 MG/ML
8.4 INJECTION, SOLUTION INTRAVENOUS
Qty: 5 | Refills: 0 | Status: ACTIVE | COMMUNITY
Start: 2023-08-02 | End: 1900-01-01

## 2023-08-08 ENCOUNTER — APPOINTMENT (OUTPATIENT)
Dept: VASCULAR SURGERY | Facility: CLINIC | Age: 83
End: 2023-08-08
Payer: MEDICARE

## 2023-08-08 PROCEDURE — 36475 ENDOVENOUS RF 1ST VEIN: CPT | Mod: LT

## 2023-08-14 ENCOUNTER — APPOINTMENT (OUTPATIENT)
Dept: VASCULAR SURGERY | Facility: CLINIC | Age: 83
End: 2023-08-14
Payer: MEDICARE

## 2023-08-14 PROCEDURE — 93971 EXTREMITY STUDY: CPT | Mod: LT

## 2023-08-24 NOTE — PROCEDURE
[FreeTextEntry1] : left AGSV RFA [FreeTextEntry3] : Procedural safety checklist and time out completed: Confirmed patient identification (Patient Name, , and/or medical record number including when possible affirmation by patient or parent/family/other). Confirmed procedure with the patient. Consent present, accurate and signed.  Confirmed special equipment and supplies are present. Sterility confirmed. Position verified.  Site/ side is marked and visible and confirmed.  Procedure confirmed by consent. Accurate consent including side and site. Review of medical records, including venous ultrasound, noting correct procedure including site and side. MD/PA verifies presence and review of imaging studies and or written report of imaging studies. Agreement on the procedure to be performed Time out completed. All of the above has been confirmed by the team. All patient-specific concerns have been addressed.   Indication: left lower extremity varicose veins with inflammation, leg pain, leg swelling, and leg cramping.  Venous insufficiency/ reflux.  Procedure: radiofrequency ablation of the left anterior great saphenous vein.  	 Ms. TA MCKEON is a 83 year old F with a history of left lower extremity varicose veins previously seen in the office.  Ultrasound examination demonstrated venous insufficiency. A trial of compression stockings, exercise, elevation, and pain medication was attempted without relief and definitive treatment with radiofrequency ablation was offered.   The patient has come for radiofrequency ablation treatment of the left anterior great saphenous vein. I have discussed the risks of the procedure at length with the patient. The risks discussed were inclusive of but not limited to infection, irritation at the site of infiltration of local anesthesia, and also rare risk of deep venous thrombosis and pulmonary emboli. The patient agrees to proceed with the procedure.  The patient was escorted into the procedure room and a time out called. The entire limb was prepped and draped in sterile fashion. The RF fiber was placed on the sterile field and connected by a sterile cable. Actuation, temperature, and impedance testing were performed to ensure that all components were connected and operating properly.  The patient was placed on the procedure table and local anesthesia was instilled in the skin overlying the access site. Under ultrasound guidance, the vein was punctured with a micropuncture needle, using the anterior wall technique. A guide wire was now introduced through the needle, and the needle was then exchanged over the guide wire for a 7F sheath. The guide wire was removed and the RF probe was then placed into the left anterior geat saphenous vein through the sheath and position confirmed using ultrasound guidance. After the RF probe position was verified by ultrasound, tumescent anesthesia consisting of normal saline, 1% lidocaine with 8.4% sodium bicarbonate was infiltrated, under ultrasound guidance, precisely into the perivenous compartment along the entire length of the vein until a halo of fluid was noted around the vein. After RF probe position was again confirmed with ultrasound imaging, RF energy was applied. The probe was gradually and carefully withdrawn at a rate of 6.5cm/20seconds.   3 cycles of RF performed using the _7 cm probe Total treatment time was 60  seconds. The total volume injected was 150  cc Treatment length was 13.5 cm and  The probe is 3.6 cm from the SFJ.  Estimated Blood Loss: minimal Repeat ultrasound of the treated vein was performed confirming successful treatment. The catheter and sheath were withdrawn and hemostasis established with direct pressure. After assuring hemostasis, a sterile 4x4 was placed on the access site and an ACE compression wrap was applied. Patient tolerated procedure well. Patient was given post-procedure instructions and follow up appointment was scheduled.

## 2023-09-27 ENCOUNTER — APPOINTMENT (OUTPATIENT)
Dept: VASCULAR SURGERY | Facility: CLINIC | Age: 83
End: 2023-09-27
Payer: MEDICARE

## 2023-09-27 PROCEDURE — 99441: CPT

## 2023-10-10 ENCOUNTER — APPOINTMENT (OUTPATIENT)
Dept: VASCULAR SURGERY | Facility: CLINIC | Age: 83
End: 2023-10-10
Payer: MEDICARE

## 2023-10-10 PROCEDURE — 36465Z: CUSTOM | Mod: LT

## 2023-10-17 ENCOUNTER — APPOINTMENT (OUTPATIENT)
Dept: VASCULAR SURGERY | Facility: CLINIC | Age: 83
End: 2023-10-17
Payer: MEDICARE

## 2023-10-17 PROCEDURE — 93971 EXTREMITY STUDY: CPT | Mod: LT

## 2023-11-08 ENCOUNTER — NON-APPOINTMENT (OUTPATIENT)
Age: 83
End: 2023-11-08

## 2023-11-08 ENCOUNTER — APPOINTMENT (OUTPATIENT)
Dept: VASCULAR SURGERY | Facility: CLINIC | Age: 83
End: 2023-11-08

## 2023-11-08 DIAGNOSIS — I83.009 VARICOSE VEINS OF UNSPECIFIED LOWER EXTREMITY WITH ULCER OF UNSPECIFIED SITE: ICD-10-CM

## 2023-11-08 DIAGNOSIS — L97.909 VARICOSE VEINS OF UNSPECIFIED LOWER EXTREMITY WITH ULCER OF UNSPECIFIED SITE: ICD-10-CM

## 2023-11-08 DIAGNOSIS — I83.893 VARICOSE VEINS OF BILATERAL LOWER EXTREMITIES WITH OTHER COMPLICATIONS: ICD-10-CM

## 2023-12-13 ENCOUNTER — APPOINTMENT (OUTPATIENT)
Dept: INTERNAL MEDICINE | Facility: CLINIC | Age: 83
End: 2023-12-13
Payer: MEDICARE

## 2023-12-13 VITALS
OXYGEN SATURATION: 96 % | BODY MASS INDEX: 22.38 KG/M2 | HEIGHT: 60 IN | HEART RATE: 63 BPM | TEMPERATURE: 98 F | SYSTOLIC BLOOD PRESSURE: 133 MMHG | WEIGHT: 114 LBS | DIASTOLIC BLOOD PRESSURE: 69 MMHG

## 2023-12-13 DIAGNOSIS — R53.1 WEAKNESS: ICD-10-CM

## 2023-12-13 DIAGNOSIS — R73.03 PREDIABETES.: ICD-10-CM

## 2023-12-13 LAB
APPEARANCE: CLEAR
BILIRUBIN URINE: NEGATIVE
BLOOD URINE: NEGATIVE
COLOR: YELLOW
GLUCOSE QUALITATIVE U: NEGATIVE MG/DL
KETONES URINE: NEGATIVE MG/DL
LEUKOCYTE ESTERASE URINE: NEGATIVE
NITRITE URINE: NEGATIVE
PH URINE: 6
PROTEIN URINE: NEGATIVE MG/DL
SPECIFIC GRAVITY URINE: 1.01
UROBILINOGEN URINE: 0.2 MG/DL

## 2023-12-13 PROCEDURE — 99214 OFFICE O/P EST MOD 30 MIN: CPT | Mod: 25

## 2023-12-13 PROCEDURE — 36415 COLL VENOUS BLD VENIPUNCTURE: CPT

## 2023-12-13 PROCEDURE — 93000 ELECTROCARDIOGRAM COMPLETE: CPT

## 2023-12-13 NOTE — HISTORY OF PRESENT ILLNESS
[FreeTextEntry1] : Episode of weakness [de-identified] : Four days ago with episode of generalized weakness causing her to sit on the ground while walking to the bathroom to urinate.  She was able to get up without difficulty and felt back to her baseline, she went to rest afterwards She denies dizziness, RAMOS, palpitations or change in MS during the event or prior to it

## 2023-12-14 LAB
ALBUMIN SERPL ELPH-MCNC: 3.8 G/DL
ALP BLD-CCNC: 58 U/L
ALT SERPL-CCNC: 12 U/L
ANION GAP SERPL CALC-SCNC: 10 MMOL/L
AST SERPL-CCNC: 19 U/L
BASOPHILS # BLD AUTO: 0.03 K/UL
BASOPHILS NFR BLD AUTO: 0.6 %
BILIRUB SERPL-MCNC: 0.4 MG/DL
BUN SERPL-MCNC: 29 MG/DL
CALCIUM SERPL-MCNC: 9.3 MG/DL
CHLORIDE SERPL-SCNC: 105 MMOL/L
CO2 SERPL-SCNC: 26 MMOL/L
CREAT SERPL-MCNC: 1 MG/DL
EGFR: 56 ML/MIN/1.73M2
EOSINOPHIL # BLD AUTO: 0.05 K/UL
EOSINOPHIL NFR BLD AUTO: 1 %
ESTIMATED AVERAGE GLUCOSE: 111 MG/DL
GLUCOSE SERPL-MCNC: 93 MG/DL
HBA1C MFR BLD HPLC: 5.5 %
HCT VFR BLD CALC: 33.9 %
HGB BLD-MCNC: 10.5 G/DL
IMM GRANULOCYTES NFR BLD AUTO: 0.2 %
LYMPHOCYTES # BLD AUTO: 1.62 K/UL
LYMPHOCYTES NFR BLD AUTO: 33.1 %
MAN DIFF?: NORMAL
MCHC RBC-ENTMCNC: 29.7 PG
MCHC RBC-ENTMCNC: 31 GM/DL
MCV RBC AUTO: 96 FL
MONOCYTES # BLD AUTO: 0.44 K/UL
MONOCYTES NFR BLD AUTO: 9 %
NEUTROPHILS # BLD AUTO: 2.74 K/UL
NEUTROPHILS NFR BLD AUTO: 56.1 %
PLATELET # BLD AUTO: 175 K/UL
POTASSIUM SERPL-SCNC: 4.2 MMOL/L
PROT SERPL-MCNC: 6.8 G/DL
RBC # BLD: 3.53 M/UL
RBC # FLD: 13.3 %
SODIUM SERPL-SCNC: 140 MMOL/L
VIT B12 SERPL-MCNC: 335 PG/ML
WBC # FLD AUTO: 4.89 K/UL

## 2024-05-20 ENCOUNTER — NON-APPOINTMENT (OUTPATIENT)
Age: 84
End: 2024-05-20

## 2024-05-20 ENCOUNTER — APPOINTMENT (OUTPATIENT)
Dept: INTERNAL MEDICINE | Facility: CLINIC | Age: 84
End: 2024-05-20
Payer: MEDICARE

## 2024-05-20 VITALS
RESPIRATION RATE: 16 BRPM | OXYGEN SATURATION: 98 % | BODY MASS INDEX: 22.78 KG/M2 | SYSTOLIC BLOOD PRESSURE: 164 MMHG | WEIGHT: 116 LBS | HEIGHT: 60 IN | DIASTOLIC BLOOD PRESSURE: 69 MMHG | TEMPERATURE: 97.7 F | HEART RATE: 63 BPM

## 2024-05-20 DIAGNOSIS — I10 ESSENTIAL (PRIMARY) HYPERTENSION: ICD-10-CM

## 2024-05-20 DIAGNOSIS — Z00.00 ENCOUNTER FOR GENERAL ADULT MEDICAL EXAMINATION W/OUT ABNORMAL FINDINGS: ICD-10-CM

## 2024-05-20 DIAGNOSIS — Z13.31 ENCOUNTER FOR SCREENING FOR DEPRESSION: ICD-10-CM

## 2024-05-20 PROCEDURE — 93000 ELECTROCARDIOGRAM COMPLETE: CPT | Mod: 59

## 2024-05-20 PROCEDURE — G0439: CPT

## 2024-05-20 PROCEDURE — 36415 COLL VENOUS BLD VENIPUNCTURE: CPT

## 2024-05-20 PROCEDURE — G0444 DEPRESSION SCREEN ANNUAL: CPT

## 2024-05-20 RX ORDER — ATENOLOL 25 MG/1
25 TABLET ORAL
Qty: 90 | Refills: 1 | Status: ACTIVE | COMMUNITY
Start: 2022-04-20 | End: 1900-01-01

## 2024-05-20 RX ORDER — LISINOPRIL AND HYDROCHLOROTHIAZIDE TABLETS 20; 25 MG/1; MG/1
20-25 TABLET ORAL
Qty: 90 | Refills: 1 | Status: DISCONTINUED | COMMUNITY
Start: 2022-04-20 | End: 2024-05-20

## 2024-05-20 RX ORDER — AMLODIPINE BESYLATE 10 MG/1
10 TABLET ORAL
Qty: 90 | Refills: 1 | Status: ACTIVE | COMMUNITY
Start: 2022-04-20 | End: 1900-01-01

## 2024-05-20 RX ORDER — LISINOPRIL 30 MG/1
30 TABLET ORAL DAILY
Qty: 90 | Refills: 1 | Status: ACTIVE | COMMUNITY
Start: 2024-05-20 | End: 1900-01-01

## 2024-05-20 NOTE — HISTORY OF PRESENT ILLNESS
[FreeTextEntry1] : CPE [de-identified] : Here for AWV Would like to discuss changing her medication and stop diuretic

## 2024-05-20 NOTE — PLAN
[FreeTextEntry1] : Reviewed age appropriate preventive screening with patient today and importance of regular screening as indicated. Encouraged exercise of at least 30 mins daily of moderate activity as tolerated.  If unable to participate in moderate activity, encouraged walking daily for 20 to 30mins. Discussed healthy dietary intake of vegetables, whole grains, lean proteins with avoidance of high sugar and sodium intake. Completed labs in office today, will await results and notify patient accordingly Reviewed PHQ2 with patient today and  total of 5 mins spent during visit  -Stop diuretic combo of lisinopril 20mg-HCTZ, increase Lisinopril to 30mg

## 2024-05-20 NOTE — HEALTH RISK ASSESSMENT
[No] : No [No falls in past year] : Patient reported no falls in the past year [0] : 2) Feeling down, depressed, or hopeless: Not at all (0) [PHQ-2 Negative - No further assessment needed] : PHQ-2 Negative - No further assessment needed [Never] : Never [None] : None [With Family] : lives with family [Feels Safe at Home] : Feels safe at home [Fully functional (bathing, dressing, toileting, transferring, walking, feeding)] : Fully functional (bathing, dressing, toileting, transferring, walking, feeding) [Fully functional (using the telephone, shopping, preparing meals, housekeeping, doing laundry, using] : Fully functional and needs no help or supervision to perform IADLs (using the telephone, shopping, preparing meals, housekeeping, doing laundry, using transportation, managing medications and managing finances) [With Patient/Caregiver] : , with patient/caregiver [Designated Healthcare Proxy] : Designated healthcare proxy [Name: ___] : Health Care Proxy's Name: [unfilled]  [Relationship: ___] : Relationship: [unfilled] [BNP2Hipxm] : 0 [Reports changes in hearing] : Reports no changes in hearing [Reports changes in vision] : Reports no changes in vision [Reports changes in dental health] : Reports no changes in dental health [AdvancecareDate] : 05/20

## 2024-05-20 NOTE — PHYSICAL EXAM
[No Edema] : there was no peripheral edema [Soft] : abdomen soft [Non Tender] : non-tender [Non-distended] : non-distended [No HSM] : no HSM [No Joint Swelling] : no joint swelling [No Rash] : no rash [Coordination Grossly Intact] : coordination grossly intact [No Focal Deficits] : no focal deficits [Normal Gait] : normal gait [Normal] : affect was normal and insight and judgment were intact

## 2024-05-23 DIAGNOSIS — D64.9 ANEMIA, UNSPECIFIED: ICD-10-CM

## 2024-05-23 LAB
ALBUMIN SERPL ELPH-MCNC: 4 G/DL
ALP BLD-CCNC: 72 U/L
ALT SERPL-CCNC: 15 U/L
ANION GAP SERPL CALC-SCNC: 9 MMOL/L
AST SERPL-CCNC: 20 U/L
BASOPHILS # BLD AUTO: 0.01 K/UL
BASOPHILS NFR BLD AUTO: 0.2 %
BILIRUB SERPL-MCNC: 0.5 MG/DL
BUN SERPL-MCNC: 27 MG/DL
CALCIUM SERPL-MCNC: 9.7 MG/DL
CHLORIDE SERPL-SCNC: 104 MMOL/L
CHOLEST SERPL-MCNC: 183 MG/DL
CO2 SERPL-SCNC: 26 MMOL/L
CREAT SERPL-MCNC: 0.96 MG/DL
EGFR: 58 ML/MIN/1.73M2
EOSINOPHIL # BLD AUTO: 0.03 K/UL
EOSINOPHIL NFR BLD AUTO: 0.6 %
ESTIMATED AVERAGE GLUCOSE: 120 MG/DL
GLUCOSE SERPL-MCNC: 93 MG/DL
HBA1C MFR BLD HPLC: 5.8 %
HCT VFR BLD CALC: 32 %
HDLC SERPL-MCNC: 70 MG/DL
HGB BLD-MCNC: 10.4 G/DL
IMM GRANULOCYTES NFR BLD AUTO: 0.2 %
LDLC SERPL CALC-MCNC: 98 MG/DL
LYMPHOCYTES # BLD AUTO: 1.58 K/UL
LYMPHOCYTES NFR BLD AUTO: 30.8 %
MAN DIFF?: NORMAL
MCHC RBC-ENTMCNC: 30.5 PG
MCHC RBC-ENTMCNC: 32.5 GM/DL
MCV RBC AUTO: 93.8 FL
MONOCYTES # BLD AUTO: 0.42 K/UL
MONOCYTES NFR BLD AUTO: 8.2 %
NEUTROPHILS # BLD AUTO: 3.08 K/UL
NEUTROPHILS NFR BLD AUTO: 60 %
NONHDLC SERPL-MCNC: 113 MG/DL
PLATELET # BLD AUTO: 169 K/UL
POTASSIUM SERPL-SCNC: 4.6 MMOL/L
PROT SERPL-MCNC: 6.9 G/DL
RBC # BLD: 3.41 M/UL
RBC # FLD: 13.2 %
SODIUM SERPL-SCNC: 138 MMOL/L
TRIGL SERPL-MCNC: 84 MG/DL
WBC # FLD AUTO: 5.13 K/UL

## 2024-08-06 ENCOUNTER — NON-APPOINTMENT (OUTPATIENT)
Age: 84
End: 2024-08-06

## 2024-08-07 ENCOUNTER — APPOINTMENT (OUTPATIENT)
Dept: INTERNAL MEDICINE | Facility: CLINIC | Age: 84
End: 2024-08-07

## 2024-08-07 PROCEDURE — G2211 COMPLEX E/M VISIT ADD ON: CPT

## 2024-08-07 PROCEDURE — 99214 OFFICE O/P EST MOD 30 MIN: CPT

## 2024-08-07 NOTE — PLAN
[FreeTextEntry1] : Uncontrolled HTN due to noncompliance with antihypertensive medications Reviewed medications with patient and her daughter Will  amlodipine today Monitor BP at home, if persistently elevated to call office, as likely will increase Lisinopril

## 2024-08-07 NOTE — HISTORY OF PRESENT ILLNESS
[FreeTextEntry1] : HTN f/u [de-identified] : HTN: Uncontrolled; states for past two weeks with elevation in normally controlled BP Has only been taking Losartan and atenolol, did not realize that she was also on norvasc Daughter become concerned about elevation and brought in today for evaluation She has not symptoms related to the elevation

## 2024-09-23 NOTE — ED PROVIDER NOTE - WR ORDER STATUS 1
"Patient calling to report recent intermittent left lower leg cramping throbbing pain mostly over night. Has been very nauseous and struggling a bit with food and fluid intake. Denies s/s DVT - warmth redness, swelling, or pain. Feeling more weak than normal. Advised trying to improved intake especially fluids. Reviewed adding electrolyte supplement to water to help with symptoms, stretch muscles, heating pad to soothe tightness and call back for any s/s DVT. Patient verbalizes understanding. Additionally sent [employer] with Pregnancy Essentials Guide for patient to review. Message to on-call Dr. Lewis for review and any further recommendations.    Reason for Disposition   [1] Caused by muscle cramps in the thigh, calf, or foot AND [2] present < 1 hour (brief, now gone)    Answer Assessment - Initial Assessment Questions  1. ONSET: \"When did the pain start?\"       Last night  2. LOCATION: \"Where is the pain located?\"       Left lower leg  3. PAIN: \"How bad is the pain?\"    (Scale 1-10; or mild, moderate, severe)      Throbbing/cramping  4. WORK OR EXERCISE: \"Has there been any recent work or exercise that involved this part of the body?\"       no  5. CAUSE: \"What do you think is causing the leg pain?\"      Possible dehydration  6. OTHER SYMPTOMS: \"Do you have any other symptoms?\" (e.g., chest pain, back pain, breathing difficulty, swelling, rash, fever, numbness, weakness)      Denies  7. PREGNANCY: \"How many weeks pregnant are you?\"       7w1d  8. JOSE: \"What date are you expecting to deliver?\"      5/11/25    Protocols used: Pregnancy - Leg Pain-Adult-AH    "
Resulted

## 2025-01-12 ENCOUNTER — EMERGENCY (EMERGENCY)
Facility: HOSPITAL | Age: 85
LOS: 1 days | Discharge: HOME | End: 2025-01-12
Attending: EMERGENCY MEDICINE
Payer: MEDICARE

## 2025-01-12 VITALS
HEART RATE: 86 BPM | WEIGHT: 119.93 LBS | HEIGHT: 63 IN | TEMPERATURE: 98 F | OXYGEN SATURATION: 100 % | RESPIRATION RATE: 16 BRPM | DIASTOLIC BLOOD PRESSURE: 79 MMHG | SYSTOLIC BLOOD PRESSURE: 191 MMHG

## 2025-01-12 VITALS
HEART RATE: 67 BPM | OXYGEN SATURATION: 98 % | RESPIRATION RATE: 18 BRPM | DIASTOLIC BLOOD PRESSURE: 74 MMHG | TEMPERATURE: 98 F | SYSTOLIC BLOOD PRESSURE: 177 MMHG

## 2025-01-12 RX ORDER — ACETAMINOPHEN 80 MG/.8ML
650 SOLUTION/ DROPS ORAL ONCE
Refills: 0 | Status: COMPLETED | OUTPATIENT
Start: 2025-01-12 | End: 2025-01-12

## 2025-01-12 RX ORDER — LIDOCAINE 50 MG/G
1 OINTMENT TOPICAL ONCE
Refills: 0 | Status: COMPLETED | OUTPATIENT
Start: 2025-01-12 | End: 2025-01-12

## 2025-01-12 RX ADMIN — ACETAMINOPHEN 650 MILLIGRAM(S): 80 SOLUTION/ DROPS ORAL at 23:39

## 2025-01-12 RX ADMIN — LIDOCAINE 1 PATCH: 50 OINTMENT TOPICAL at 23:39

## 2025-01-12 NOTE — ED ADULT TRIAGE NOTE - NSWEIGHTCALCTOOLDRUG_GEN_A_CORE
Impression: Vitreous degeneration, bilateral: H43.813. Stable Plan: Discussed Dx of posterior vitreous detachment. Discussed signs and symptoms of retinal tear/detachment. Pt to RTC PRN with any change to visual status.  used

## 2025-01-12 NOTE — ED PROVIDER NOTE - NSFOLLOWUPINSTRUCTIONS_ED_ALL_ED_FT
IMPORTANT INSTRUCTIONS FROM Dr. KESSLER:    Please follow up with your personal medical doctor in 24-48 hours.   Bring results from today to your visit.    Make an appt with the spine center within a few days. Call [phone] for an appt.     If you were advised to take any medications - be sure to review the package insert.    If your symptoms change, get worse or if you have any new symptoms, come to the ER right away.  If you have any questions, call the ER at the phone number on this page.

## 2025-01-12 NOTE — ED PROVIDER NOTE - PHYSICAL EXAMINATION
NAD. Hypertensive. Afebrile. A&Ox4. Neck supple. Lungs clear. No spinal midline tender. +Right lower para lumbar tender. No CVA tender. ABD soft, non tender. NO hip tender. No peripheral edema. Neuro- intact.

## 2025-01-12 NOTE — ED PROVIDER NOTE - OBJECTIVE STATEMENT
84yo female with PMHx of HTN presents to ED with right low back pain for 2 weeks. Denies injury. She's been on Tylenol 500mg but no relief. She notice worsening pain today. She took Tylenol this am. Reports she ambulates independently without assist. Denies radiating pain, sensory changes or weakness to extremities. Denies fever, chills, or recent sickness. Denies abd pain, N/V/D. or urinary symptoms.

## 2025-01-12 NOTE — ED PROVIDER NOTE - PATIENT PORTAL LINK FT
You can access the FollowMyHealth Patient Portal offered by [facility] by registering at the following website: [email or web address]. By joining [facility]’s FollowMyHealth portal, you will also be able to view your health information using other applications (apps) compatible with our system.

## 2025-01-12 NOTE — ED ADULT TRIAGE NOTE - CHIEF COMPLAINT QUOTE
R lower back pain x 2 weeks worsening today, reports worsening pain on movement. Denies trauma, numbness, tingling on extremities.

## 2025-01-13 NOTE — ED ADULT NURSE NOTE - OBJECTIVE STATEMENT
Continued Stay Note  [facility]     Patient Name: Melania Murrell  MRN: [id number]  Today's Date: 6/15/2022    Admit Date: 6/14/2022     Discharge Plan     Row Name 06/15/22 0838       Plan    Plan Home    Patient/Family in Agreement with Plan yes    Final Discharge Disposition Code 01 - home or self-care               Discharge Codes    No documentation.               Expected Discharge Date and Time     Expected Discharge Date Expected Discharge Time    Ronak 15, 2022             Concepcion Arreola RN     86y/o Female presents to ED c/o back pain x 2 weeks. Pt denies injury to the site. Pt states taking Tylenol 500mg intermittently for pain. Pt endorses 10/10 pain. PMHx HTN. Pt denies headache, f/c/n/v/d, chest pain, SOB, abdominal pain.   Pt is AxOx4. ABCs intact. Gross neuro intact. Pt demonstrates continence, is ambulatory unassisted. Stretcher locked and in lowest position, appropriate side rails up. Pt instructed to notify RN if assistance is needed.

## 2025-02-11 NOTE — ED PROVIDER NOTE - NEUROLOGICAL SENSATION
[facility]   Plastic and Reconstructive Surgery   [address] [place] [address]       CLINIC NOTE      Assessment & Plan  Malignant neoplasm of central portion of right breast in female, estrogen receptor positive (HCC)  Jenny Pereyra is a 47-year-old female who is 12 weeks status post bilateral mastectomy, right sentinel lymph node biopsy, and immediate bilateral breast reconstruction with tissue expanders.  Postoperative course complicated by infection of right reconstructed breast with partial wound dehiscence and exposure of tissue expander.  Patient is now 7 weeks s/p removal of bilateral tissue expanders, washout, closure and completion of a course tailored abx.     Overall, Kristy has healed very well.  Her chest skin appears healthy and viable.  No signs of infection or wound breakdown.  Jenny is unhappy being flat.  She notes that she is struggling with her body image to the point where she is not leaving the house very much. Expressed today that she is interested in making plans for delayed reconstruction.  Discussed with the patient that I would like to wait a minimum of 3 months since the time of her expander removal.  The remainder of our discussion surrounded delayed reconstruction.  We discussed that she will require placement of bilateral tissue expanders.  I discussed that I feel the safest thing would be to place the tissue expanders beneath her pectoralis muscle.  Given the history of infection, excision of skin margins, contraction of the skin envelope we will likely have to place a smaller expander and slowly expand her tissues.  Also discussed with the patient that while we should be able to achieve reconstruction of the breast mound will likely be smaller than her original breast.  Discussed with the patient that the benefit of delayed reconstruction in her case is that we are not up against time as she requires no further cancer treatment.  We can plan this at a time that is best for  her and take as much time for expansion as her skin requires to do it safely.    We will plan to meet again in a few weeks to further discuss our operative plan.    Interval History:  Jenny presents today to discuss delayed reconstruction. She notes that she is not happy being flat chested.  She notes that she is not leaving her house much because she is very uncomfortable with her body image.  She has tried using the breast prosthetics and is still not comfortable. She would like to proceed with staged breast reconstruction if possible.       Physical Exam   Alert, oriented, NAD  Breathing comfortably on room air  Bilateral chest with well healed, transverse incisions, no fluctuance or fullness. Mild excess skin bilaterally in the form of standing cone deformities at the medial and lateral aspects of each incision.  Skin appears healthy and well perfused. 2s cap refill.     present and normal in 4 extremities

## 2025-03-20 ENCOUNTER — APPOINTMENT (OUTPATIENT)
Dept: INTERNAL MEDICINE | Facility: CLINIC | Age: 85
End: 2025-03-20
Payer: MEDICARE

## 2025-03-20 VITALS
HEART RATE: 82 BPM | RESPIRATION RATE: 16 BRPM | OXYGEN SATURATION: 98 % | BODY MASS INDEX: 22.88 KG/M2 | DIASTOLIC BLOOD PRESSURE: 74 MMHG | SYSTOLIC BLOOD PRESSURE: 187 MMHG | WEIGHT: 109 LBS | HEIGHT: 58 IN | TEMPERATURE: 97.8 F

## 2025-03-20 DIAGNOSIS — Z79.899 OTHER LONG TERM (CURRENT) DRUG THERAPY: ICD-10-CM

## 2025-03-20 DIAGNOSIS — I10 ESSENTIAL (PRIMARY) HYPERTENSION: ICD-10-CM

## 2025-03-20 PROCEDURE — 99213 OFFICE O/P EST LOW 20 MIN: CPT

## 2025-03-24 ENCOUNTER — RX RENEWAL (OUTPATIENT)
Age: 85
End: 2025-03-24

## 2025-09-16 ENCOUNTER — NON-APPOINTMENT (OUTPATIENT)
Age: 85
End: 2025-09-16

## 2025-09-17 ENCOUNTER — NON-APPOINTMENT (OUTPATIENT)
Age: 85
End: 2025-09-17

## 2025-09-18 ENCOUNTER — LABORATORY RESULT (OUTPATIENT)
Age: 85
End: 2025-09-18

## 2025-09-18 ENCOUNTER — APPOINTMENT (OUTPATIENT)
Dept: INTERNAL MEDICINE | Facility: CLINIC | Age: 85
End: 2025-09-18
Payer: MEDICARE

## 2025-09-18 VITALS
TEMPERATURE: 98 F | HEART RATE: 70 BPM | OXYGEN SATURATION: 94 % | DIASTOLIC BLOOD PRESSURE: 68 MMHG | WEIGHT: 107.56 LBS | SYSTOLIC BLOOD PRESSURE: 187 MMHG | HEIGHT: 58 IN | BODY MASS INDEX: 22.58 KG/M2

## 2025-09-18 DIAGNOSIS — Z00.00 ENCOUNTER FOR GENERAL ADULT MEDICAL EXAMINATION W/OUT ABNORMAL FINDINGS: ICD-10-CM

## 2025-09-18 DIAGNOSIS — R53.1 WEAKNESS: ICD-10-CM

## 2025-09-18 DIAGNOSIS — R63.4 ABNORMAL WEIGHT LOSS: ICD-10-CM

## 2025-09-18 DIAGNOSIS — I10 ESSENTIAL (PRIMARY) HYPERTENSION: ICD-10-CM

## 2025-09-18 DIAGNOSIS — R60.0 LOCALIZED EDEMA: ICD-10-CM

## 2025-09-18 DIAGNOSIS — M54.50 LOW BACK PAIN, UNSPECIFIED: ICD-10-CM

## 2025-09-18 DIAGNOSIS — G89.29 LOW BACK PAIN, UNSPECIFIED: ICD-10-CM

## 2025-09-18 DIAGNOSIS — L03.115 CELLULITIS OF RIGHT LOWER LIMB: ICD-10-CM

## 2025-09-18 PROCEDURE — G2211 COMPLEX E/M VISIT ADD ON: CPT

## 2025-09-18 PROCEDURE — G0439: CPT

## 2025-09-18 PROCEDURE — 36415 COLL VENOUS BLD VENIPUNCTURE: CPT

## 2025-09-18 PROCEDURE — 99214 OFFICE O/P EST MOD 30 MIN: CPT | Mod: 25

## 2025-09-18 RX ORDER — DICLOFENAC SODIUM 3 G/100G
3 GEL TOPICAL
Qty: 1 | Refills: 2 | Status: ACTIVE | COMMUNITY
Start: 2025-09-18 | End: 1900-01-01

## 2025-09-22 PROBLEM — N39.0 ACUTE UTI: Status: ACTIVE | Noted: 2025-09-22 | Resolved: 2025-10-22

## 2025-09-22 LAB
ALBUMIN SERPL ELPH-MCNC: 3.7 G/DL
ALP BLD-CCNC: 55 U/L
ALT SERPL-CCNC: 13 U/L
ANION GAP SERPL CALC-SCNC: 12 MMOL/L
APPEARANCE: ABNORMAL
AST SERPL-CCNC: 22 U/L
BILIRUB SERPL-MCNC: 0.6 MG/DL
BILIRUBIN URINE: NEGATIVE
BLOOD URINE: ABNORMAL
BUN SERPL-MCNC: 19 MG/DL
CALCIUM SERPL-MCNC: 9.4 MG/DL
CHLORIDE SERPL-SCNC: 109 MMOL/L
CHOLEST SERPL-MCNC: 162 MG/DL
CO2 SERPL-SCNC: 23 MMOL/L
COLOR: YELLOW
CREAT SERPL-MCNC: 0.84 MG/DL
EGFRCR SERPLBLD CKD-EPI 2021: 68 ML/MIN/1.73M2
ESTIMATED AVERAGE GLUCOSE: 108 MG/DL
GLUCOSE QUALITATIVE U: NEGATIVE MG/DL
GLUCOSE SERPL-MCNC: 85 MG/DL
HBA1C MFR BLD HPLC: 5.4 %
HCT VFR BLD CALC: 30.5 %
HDLC SERPL-MCNC: 61 MG/DL
HGB BLD-MCNC: 9.8 G/DL
KETONES URINE: NEGATIVE MG/DL
LDLC SERPL-MCNC: 85 MG/DL
LEUKOCYTE ESTERASE URINE: ABNORMAL
MCHC RBC-ENTMCNC: 31 PG
MCHC RBC-ENTMCNC: 32.1 G/DL
MCV RBC AUTO: 96.5 FL
NITRITE URINE: NEGATIVE
NONHDLC SERPL-MCNC: 101 MG/DL
PH URINE: 5.5
PLATELET # BLD AUTO: 179 K/UL
POTASSIUM SERPL-SCNC: 4.4 MMOL/L
PROT SERPL-MCNC: 6.6 G/DL
PROTEIN URINE: 30 MG/DL
RBC # BLD: 3.16 M/UL
RBC # FLD: 14.4 %
SODIUM SERPL-SCNC: 144 MMOL/L
SPECIFIC GRAVITY URINE: 1.02
TRIGL SERPL-MCNC: 80 MG/DL
UROBILINOGEN URINE: 0.2 MG/DL
VIT B12 SERPL-MCNC: 357 PG/ML
WBC # FLD AUTO: 4.92 K/UL